# Patient Record
Sex: FEMALE | Race: WHITE
[De-identification: names, ages, dates, MRNs, and addresses within clinical notes are randomized per-mention and may not be internally consistent; named-entity substitution may affect disease eponyms.]

---

## 2019-01-03 ENCOUNTER — HOSPITAL ENCOUNTER (OUTPATIENT)
Dept: HOSPITAL 88 - OR | Age: 64
Discharge: HOME | End: 2019-01-03
Attending: PLASTIC SURGERY
Payer: COMMERCIAL

## 2019-01-03 VITALS — SYSTOLIC BLOOD PRESSURE: 136 MMHG | DIASTOLIC BLOOD PRESSURE: 74 MMHG

## 2019-01-03 DIAGNOSIS — M65.341: ICD-10-CM

## 2019-01-03 DIAGNOSIS — I10: ICD-10-CM

## 2019-01-03 DIAGNOSIS — M65.342: Primary | ICD-10-CM

## 2019-01-03 DIAGNOSIS — Z01.810: ICD-10-CM

## 2019-01-03 DIAGNOSIS — I83.90: ICD-10-CM

## 2019-01-03 DIAGNOSIS — M26.609: ICD-10-CM

## 2019-01-03 DIAGNOSIS — Z79.82: ICD-10-CM

## 2019-01-03 PROCEDURE — 26055 INCISE FINGER TENDON SHEATH: CPT

## 2019-01-03 PROCEDURE — 93005 ELECTROCARDIOGRAM TRACING: CPT

## 2019-01-03 NOTE — OPERATIVE REPORT
DATE OF PROCEDURE:  January 03, 2019 

 

PREOPERATIVE DIAGNOSIS:  Stenosing tenosynovitis of right and left ring 

finger.



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 

finger.



OPERATION PERFORMED:  Tenovaginotomy of right and left ring finger.



ANESTHESIA:  General.



HISTORY:  The patient is a 63-year-old ___right____-hand-dominant female who 

presents with stenosing tenosynovitis of the right and left ring finger 

that is recalcitrant to conservative treatment.  The risks, benefits, and 

alternatives of treatment were discussed with the patient and she is 

prepared to undergo the procedure as outlined.



DESCRIPTION OF PROCEDURE:  The patient was brought to the operating 

theater.  After the induction of adequate general anesthesia, the patient 

was prepped and draped in a supine position.  A time out was performed by 

the entire operating room team.  An oblique incision was marked out over 

the A1 pulley of the right and left ring finger.  The upper extremity was 

exsanguinated, and a tourniquet was inflated to a pressure of 250 mmHg.  

The incision was made through the skin and subcutaneous tissues.  All 

venous tributaries were controlled with bipolar cautery.  The incision was 

deepened through the palmar tissues.  The neurovascular bundles on the 

radial and ulnar sides of the flexor tendon sheath were identified and 

retracted away from the flexor tendon sheath and preserved.  The A1 pulley 

of the affected finger was identified and incised longitudinally, taking 

care to protect and preserve the flexor tendons within the sheath.  After 

the complete length of the pulley had been transected, the tendons were 

placed in a range of motion.  There was noted to be good motion without any 

locking.  The wound was then copiously irrigated with bacteriostatic saline 

and closed with 5-0 nylon in an interrupted horizontal mattress fashion.  A 

Marcaine field block was performed at the operative site.  The tourniquet 

was deflated.  All the fingers pinked up nicely.  A sterile bulky 

conforming bandage was applied to the hand, and the patient was returned to 

the recovery room in satisfactory 

condition and was discharged with a postoperative instruction sheet as well 

as a followup appointment.  









DD:  01/03/2019 12:57

DT:  01/03/2019 14:11

Job#:  [id number]



OLAMIDE

## 2023-05-08 LAB
ANION GAP SERPL CALC-SCNC: 10.8 MMOL/L (ref 8–16)
BASOPHILS # BLD AUTO: 0.1 10*3/UL (ref 0–0.1)
BASOPHILS NFR BLD AUTO: 0.9 % (ref 0–1)
BUN SERPL-MCNC: 14 MG/DL (ref 7–26)
BUN/CREAT SERPL: 16 (ref 6–25)
CALCIUM SERPL-MCNC: 9.4 MG/DL (ref 8.4–10.2)
CHLORIDE SERPL-SCNC: 107 MMOL/L (ref 98–107)
CO2 SERPL-SCNC: 27 MMOL/L (ref 22–29)
DEPRECATED NEUTROPHILS # BLD AUTO: 4.4 10*3/UL (ref 2.1–6.9)
EOSINOPHIL # BLD AUTO: 0.5 10*3/UL (ref 0–0.4)
EOSINOPHIL NFR BLD AUTO: 7 % (ref 0–6)
ERYTHROCYTE [DISTWIDTH] IN CORD BLOOD: 14.6 % (ref 11.7–14.4)
GLUCOSE SERPLBLD-MCNC: 121 MG/DL (ref 74–118)
HCT VFR BLD AUTO: 35.9 % (ref 34.2–44.1)
HGB BLD-MCNC: 12.5 G/DL (ref 12–16)
LYMPHOCYTES # BLD: 1.6 10*3/UL (ref 1–3.2)
LYMPHOCYTES NFR BLD AUTO: 21.1 % (ref 18–39.1)
MCH RBC QN AUTO: 32.8 PG (ref 28–32)
MCHC RBC AUTO-ENTMCNC: 34.8 G/DL (ref 31–35)
MCV RBC AUTO: 94.2 FL (ref 81–99)
MONOCYTES # BLD AUTO: 0.8 10*3/UL (ref 0.2–0.8)
MONOCYTES NFR BLD AUTO: 10.6 % (ref 4.4–11.3)
NEUTS SEG NFR BLD AUTO: 59.6 % (ref 38.7–80)
PLATELET # BLD AUTO: 275 X10E3/UL (ref 140–360)
POTASSIUM SERPL-SCNC: 3.8 MMOL/L (ref 3.5–5.1)
RBC # BLD AUTO: 3.81 X10E6/UL (ref 3.6–5.1)
SODIUM SERPL-SCNC: 141 MMOL/L (ref 136–145)

## 2023-05-11 ENCOUNTER — HOSPITAL ENCOUNTER (OUTPATIENT)
Dept: HOSPITAL 88 - OR | Age: 68
Discharge: HOME | End: 2023-05-11
Attending: PLASTIC SURGERY
Payer: MEDICARE

## 2023-05-11 VITALS
RESPIRATION RATE: 17 BRPM | OXYGEN SATURATION: 95 % | DIASTOLIC BLOOD PRESSURE: 71 MMHG | SYSTOLIC BLOOD PRESSURE: 130 MMHG | HEART RATE: 17 BPM

## 2023-05-11 DIAGNOSIS — Z01.812: ICD-10-CM

## 2023-05-11 DIAGNOSIS — Z79.899: ICD-10-CM

## 2023-05-11 DIAGNOSIS — Z01.810: ICD-10-CM

## 2023-05-11 DIAGNOSIS — M65.331: Primary | ICD-10-CM

## 2023-05-11 DIAGNOSIS — Z01.818: ICD-10-CM

## 2023-05-11 DIAGNOSIS — Z79.82: ICD-10-CM

## 2023-05-11 DIAGNOSIS — I10: ICD-10-CM

## 2023-05-11 PROCEDURE — 85025 COMPLETE CBC W/AUTO DIFF WBC: CPT

## 2023-05-11 PROCEDURE — 80048 BASIC METABOLIC PNL TOTAL CA: CPT

## 2023-05-11 PROCEDURE — 26055 INCISE FINGER TENDON SHEATH: CPT

## 2023-05-11 PROCEDURE — 93005 ELECTROCARDIOGRAM TRACING: CPT

## 2023-05-11 PROCEDURE — 71046 X-RAY EXAM CHEST 2 VIEWS: CPT

## 2023-05-11 PROCEDURE — 36415 COLL VENOUS BLD VENIPUNCTURE: CPT
